# Patient Record
Sex: FEMALE | Race: WHITE | NOT HISPANIC OR LATINO | URBAN - NONMETROPOLITAN AREA
[De-identification: names, ages, dates, MRNs, and addresses within clinical notes are randomized per-mention and may not be internally consistent; named-entity substitution may affect disease eponyms.]

---

## 2017-08-03 ENCOUNTER — OPTICAL OFFICE (OUTPATIENT)
Dept: URBAN - NONMETROPOLITAN AREA CLINIC 4 | Facility: CLINIC | Age: 22
Setting detail: OPHTHALMOLOGY
End: 2017-08-03
Payer: COMMERCIAL

## 2017-08-03 DIAGNOSIS — H52.223: ICD-10-CM

## 2017-08-03 PROCEDURE — V2103 SPHEROCYLINDR 4.00D/12-2.00D: HCPCS | Performed by: OPTOMETRIST

## 2017-08-03 PROCEDURE — V2020 VISION SVCS FRAMES PURCHASES: HCPCS | Performed by: OPTOMETRIST

## 2019-10-15 ENCOUNTER — RX ONLY (RX ONLY)
Age: 24
End: 2019-10-15

## 2019-10-15 ENCOUNTER — DOCTOR'S OFFICE (OUTPATIENT)
Dept: URBAN - NONMETROPOLITAN AREA CLINIC 1 | Facility: CLINIC | Age: 24
Setting detail: OPHTHALMOLOGY
End: 2019-10-15
Payer: COMMERCIAL

## 2019-10-15 ENCOUNTER — OPTICAL OFFICE (OUTPATIENT)
Dept: URBAN - NONMETROPOLITAN AREA CLINIC 4 | Facility: CLINIC | Age: 24
Setting detail: OPHTHALMOLOGY
End: 2019-10-15
Payer: COMMERCIAL

## 2019-10-15 DIAGNOSIS — H52.223: ICD-10-CM

## 2019-10-15 DIAGNOSIS — Z01.00: ICD-10-CM

## 2019-10-15 DIAGNOSIS — H52.13: ICD-10-CM

## 2019-10-15 PROCEDURE — V2103 SPHEROCYLINDR 4.00D/12-2.00D: HCPCS | Performed by: OPTOMETRIST

## 2019-10-15 PROCEDURE — V2020 VISION SVCS FRAMES PURCHASES: HCPCS | Performed by: OPTOMETRIST

## 2019-10-15 PROCEDURE — 92014 COMPRE OPH EXAM EST PT 1/>: CPT | Performed by: OPTOMETRIST

## 2019-10-15 ASSESSMENT — REFRACTION_CURRENTRX
OS_OVR_VA: 20/
OD_OVR_VA: 20/
OD_AXIS: 100
OS_OVR_VA: 20/
OD_SPHERE: -1.75
OS_OVR_VA: 20/
OS_CYLINDER: -1.00
OD_VPRISM_DIRECTION: SV
OS_SPHERE: -1.75
OD_OVR_VA: 20/
OS_VPRISM_DIRECTION: SV
OD_CYLINDER: -0.75
OS_AXIS: 085
OD_OVR_VA: 20/

## 2019-10-15 ASSESSMENT — REFRACTION_MANIFEST
OS_AXIS: 085
OS_CYLINDER: -1.00
OD_VA3: 20/
OD_SPHERE: -2.00
OS_SPHERE: -2.25
OU_VA: 20/
OS_VA2: 20/
OD_VA1: 20/20-2
OS_VA1: 20/20-2
OD_VA1: 20/
OS_VA3: 20/
OS_VA2: 20/25
OD_VA2: 20/25
OS_VA3: 20/
OD_VA3: 20/
OD_VA2: 20/
OD_CYLINDER: -1.00
OS_VA1: 20/
OD_AXIS: 100
OU_VA: 20/

## 2019-10-15 ASSESSMENT — VISUAL ACUITY
OS_BCVA: 20/20-1
OD_BCVA: 20/20-2

## 2019-10-15 ASSESSMENT — AXIALLENGTH_DERIVED
OS_AL: 60.57
OS_AL: 62.22
OD_AL: 59.73
OD_AL: 60.05

## 2019-10-15 ASSESSMENT — REFRACTION_AUTOREFRACTION
OD_CYLINDER: -1.25
OD_AXIS: 101
OS_AXIS: 79
OS_CYLINDER: -1.25
OS_SPHERE: -2.75
OD_SPHERE: -2.00

## 2019-10-15 ASSESSMENT — KERATOMETRY
OD_K1POWER_DIOPTERS: 7.34
OD_K2POWER_DIOPTERS: 7.24
OD_AXISANGLE_DEGREES: 110
OS_AXISANGLE_DEGREES: 77
OS_K1POWER_DIOPTERS: 7.28
OS_K2POWER_DIOPTERS: 7.13

## 2019-10-15 ASSESSMENT — CONFRONTATIONAL VISUAL FIELD TEST (CVF)
OS_FINDINGS: FULL
OD_FINDINGS: FULL

## 2019-10-15 ASSESSMENT — SPHEQUIV_DERIVED
OS_SPHEQUIV: -3.375
OS_SPHEQUIV: -2.75
OD_SPHEQUIV: -2.625
OD_SPHEQUIV: -2.5

## 2021-09-28 DIAGNOSIS — D16.9 BENIGN NEOPLASM OF BONE AND ARTICULAR CARTILAGE, UNSPECIFIED: ICD-10-CM

## 2021-09-28 DIAGNOSIS — R00.2 PALPITATIONS: ICD-10-CM

## 2022-04-06 ENCOUNTER — HOSPITAL ENCOUNTER (OUTPATIENT)
Dept: NON INVASIVE DIAGNOSTICS | Facility: HOSPITAL | Age: 27
Discharge: HOME | End: 2022-04-06
Payer: COMMERCIAL

## 2022-04-06 VITALS
HEIGHT: 64 IN | WEIGHT: 183 LBS | SYSTOLIC BLOOD PRESSURE: 120 MMHG | DIASTOLIC BLOOD PRESSURE: 64 MMHG | BODY MASS INDEX: 31.24 KG/M2

## 2022-04-06 DIAGNOSIS — R00.2 PALPITATIONS: ICD-10-CM

## 2022-04-06 DIAGNOSIS — D16.9 BENIGN NEOPLASM OF BONE AND ARTICULAR CARTILAGE, UNSPECIFIED: ICD-10-CM

## 2022-04-06 LAB
AORTIC VALVE ANNULUS: 2.6 CM (ref 1.69–2.47)
APICAL FOUR CHAMBER EJECTION FRACTION: 74 %
E WAVE DECELERATION TIME: 205 MS
FRACTIONAL SHORTENING: 39 % (ref 28–44)
INTERVENTRICULAR SEPTUM IN DIASTOLE (PARASTERNAL SHORT AXIS VIEW): 0.9 CM
INTERVENTRICULAR SEPTUM: 0.9 CM (ref 0.53–0.99)
LAAS-AP4: 13.1 CM2
LEFT ATRIUM SIZE: 2.9 CM
LEFT INTERNAL DIMENSION IN SYSTOLE: 2.8 CM (ref 2.83–4.28)
LEFT VENTRICULAR INTERNAL DIMENSION IN DIASTOLE: 4.6 CM (ref 4.64–6.91)
LEFT VENTRICULAR POSTERIOR WALL IN END DIASTOLE: 0.8 CM (ref 0.52–0.98)
LEFT VENTRICULAR STROKE VOLUME: 69 ML
LVSV (TEICH): 69 ML
MV E'TISSUE VEL-LAT: 25 CM/S
MV E'TISSUE VEL-SEP: 13 CM/S
MV PEAK A VEL: 0.72 M/S
MV PEAK E VEL: 93 CM/S
SL CV PED ECHO LEFT VENTRICLE DIASTOLIC VOLUME (MOD BIPLANE) 2D: 100 ML
SL CV PED ECHO LEFT VENTRICLE SYSTOLIC VOLUME (MOD BIPLANE) 2D: 30 ML
Z-SCORE OF AORTIC VALVE ANNULUS: 2.66
Z-SCORE OF INTERVENTRICULAR SEPTUM IN END DIASTOLE: 1.18
Z-SCORE OF LEFT VENTRICULAR DIMENSION IN END DIASTOLE: -2.08
Z-SCORE OF LEFT VENTRICULAR DIMENSION IN END SYSTOLE: -1.72
Z-SCORE OF LEFT VENTRICULAR POSTERIOR WALL IN END DIASTOLE: 0.44

## 2022-04-06 PROCEDURE — 93306 TTE W/DOPPLER COMPLETE: CPT

## 2022-11-29 ENCOUNTER — DOCTOR'S OFFICE (OUTPATIENT)
Dept: URBAN - NONMETROPOLITAN AREA CLINIC 1 | Facility: CLINIC | Age: 27
Setting detail: OPHTHALMOLOGY
End: 2022-11-29
Payer: COMMERCIAL

## 2022-11-29 ENCOUNTER — OPTICAL OFFICE (OUTPATIENT)
Dept: URBAN - NONMETROPOLITAN AREA CLINIC 4 | Facility: CLINIC | Age: 27
Setting detail: OPHTHALMOLOGY
End: 2022-11-29
Payer: COMMERCIAL

## 2022-11-29 DIAGNOSIS — H52.13: ICD-10-CM

## 2022-11-29 DIAGNOSIS — Z01.00: ICD-10-CM

## 2022-11-29 PROCEDURE — V2020 VISION SVCS FRAMES PURCHASES: HCPCS | Performed by: OPTOMETRIST

## 2022-11-29 PROCEDURE — V2784 LENS POLYCARB OR EQUAL: HCPCS | Performed by: OPTOMETRIST

## 2022-11-29 PROCEDURE — 92004 COMPRE OPH EXAM NEW PT 1/>: CPT | Performed by: OPTOMETRIST

## 2022-11-29 PROCEDURE — V2103 SPHEROCYLINDR 4.00D/12-2.00D: HCPCS | Performed by: OPTOMETRIST

## 2022-11-29 ASSESSMENT — REFRACTION_AUTOREFRACTION
OD_SPHERE: -2.50
OD_AXIS: 100
OS_AXIS: 070
OS_SPHERE: -2.75
OD_CYLINDER: -0.50
OS_CYLINDER: -0.75

## 2022-11-29 ASSESSMENT — AXIALLENGTH_DERIVED
OS_AL: 61.88
OD_AL: 60.36
OS_AL: 61.55
OD_AL: 61.34

## 2022-11-29 ASSESSMENT — REFRACTION_CURRENTRX
OD_AXIS: 104
OS_VPRISM_DIRECTION: SV
OS_SPHERE: -2.25
OD_OVR_VA: 20/
OD_SPHERE: -2.00
OD_VPRISM_DIRECTION: SV
OD_CYLINDER: -1.00
OS_AXIS: 090
OS_CYLINDER: -1.00
OS_OVR_VA: 20/

## 2022-11-29 ASSESSMENT — CONFRONTATIONAL VISUAL FIELD TEST (CVF)
OD_FINDINGS: FULL
OS_FINDINGS: FULL

## 2022-11-29 ASSESSMENT — KERATOMETRY
OS_K1POWER_DIOPTERS: 7.28
OS_AXISANGLE_DEGREES: 77
OD_K2POWER_DIOPTERS: 7.24
OD_AXISANGLE_DEGREES: 110
OS_K2POWER_DIOPTERS: 7.13
OD_K1POWER_DIOPTERS: 7.34

## 2022-11-29 ASSESSMENT — VISUAL ACUITY
OD_BCVA: 20/40+1
OS_BCVA: 20/40-1

## 2022-11-29 ASSESSMENT — SPHEQUIV_DERIVED
OS_SPHEQUIV: -3.25
OD_SPHEQUIV: -3.125
OD_SPHEQUIV: -2.75
OS_SPHEQUIV: -3.125

## 2022-11-29 ASSESSMENT — REFRACTION_MANIFEST
OD_VA1: 20/25+2
OD_SPHERE: -2.50
OD_VA2: 20/25
OD_AXIS: 100
OS_SPHERE: -2.75
OS_CYLINDER: -1.00
OS_VA1: 20/25+2
OS_AXIS: 085
OS_VA2: 20/25
OD_CYLINDER: -1.25

## 2022-11-29 ASSESSMENT — TONOMETRY
OS_IOP_MMHG: 15
OD_IOP_MMHG: 15

## 2023-11-30 ENCOUNTER — DOCTOR'S OFFICE (OUTPATIENT)
Dept: URBAN - NONMETROPOLITAN AREA CLINIC 1 | Facility: CLINIC | Age: 28
Setting detail: OPHTHALMOLOGY
End: 2023-11-30
Payer: COMMERCIAL

## 2023-11-30 DIAGNOSIS — H52.223: ICD-10-CM

## 2023-11-30 DIAGNOSIS — H52.13: ICD-10-CM

## 2023-11-30 PROBLEM — H04.123 DRY EYE SYNDROME LACRIMAL GLAND; BOTH EYES: Status: ACTIVE | Noted: 2023-11-30

## 2023-11-30 PROCEDURE — 92015 DETERMINE REFRACTIVE STATE: CPT

## 2023-11-30 PROCEDURE — 92014 COMPRE OPH EXAM EST PT 1/>: CPT

## 2023-11-30 ASSESSMENT — REFRACTION_CURRENTRX
OS_OVR_VA: 20/
OD_SPHERE: -2.50
OD_VPRISM_DIRECTION: SV
OS_CYLINDER: -1.00
OS_VPRISM_DIRECTION: SV
OD_AXIS: 096
OS_SPHERE: -2.75
OD_CYLINDER: -1.25
OD_OVR_VA: 20/
OS_AXIS: 085

## 2023-11-30 ASSESSMENT — CONFRONTATIONAL VISUAL FIELD TEST (CVF)
OD_FINDINGS: FULL
OS_FINDINGS: FULL

## 2023-11-30 ASSESSMENT — TONOMETRY
OS_IOP_MMHG: 16
OD_IOP_MMHG: 16

## 2023-11-30 ASSESSMENT — REFRACTION_MANIFEST
OD_VA2: 20/25
OD_VA1: 20/25
OS_SPHERE: -3.00
OD_AXIS: 095
OD_SPHERE: -2.75
OS_VA2: 20/25
OS_VA1: 20/25
OU_VA: 20/25
OD_CYLINDER: -1.25
OS_AXIS: 075
OS_CYLINDER: -1.50

## 2023-11-30 ASSESSMENT — SPHEQUIV_DERIVED
OD_SPHEQUIV: -2.625
OS_SPHEQUIV: -3.75
OS_SPHEQUIV: -3.25
OD_SPHEQUIV: -3.375

## 2023-11-30 ASSESSMENT — REFRACTION_AUTOREFRACTION
OS_AXIS: 086
OD_CYLINDER: -1.25
OD_SPHERE: -2.00
OS_CYLINDER: -1.00
OS_SPHERE: -2.75
OD_AXIS: 106

## 2023-11-30 ASSESSMENT — VISUAL ACUITY
OS_BCVA: 20/30-1
OD_BCVA: 20/40

## 2023-11-30 ASSESSMENT — SUPERFICIAL PUNCTATE KERATITIS (SPK)
OS_SPK: T
OD_SPK: T

## 2023-11-30 ASSESSMENT — TEAR BREAK UP TIME (TBUT)
OS_TBUT: 5 SEC
OD_TBUT: 5 SEC

## 2024-01-09 ENCOUNTER — APPOINTMENT (OUTPATIENT)
Dept: RADIOLOGY | Facility: CLINIC | Age: 29
End: 2024-01-09
Payer: COMMERCIAL

## 2024-01-09 ENCOUNTER — OFFICE VISIT (OUTPATIENT)
Dept: URGENT CARE | Facility: CLINIC | Age: 29
End: 2024-01-09
Payer: COMMERCIAL

## 2024-01-09 VITALS
TEMPERATURE: 97.6 F | WEIGHT: 135 LBS | HEIGHT: 64 IN | SYSTOLIC BLOOD PRESSURE: 124 MMHG | OXYGEN SATURATION: 99 % | BODY MASS INDEX: 23.05 KG/M2 | RESPIRATION RATE: 16 BRPM | HEART RATE: 76 BPM | DIASTOLIC BLOOD PRESSURE: 70 MMHG

## 2024-01-09 DIAGNOSIS — S16.1XXA STRAIN OF MUSCLE, FASCIA AND TENDON AT NECK LEVEL, INITIAL ENCOUNTER: Primary | ICD-10-CM

## 2024-01-09 DIAGNOSIS — S16.1XXA STRAIN OF MUSCLE, FASCIA AND TENDON AT NECK LEVEL, INITIAL ENCOUNTER: ICD-10-CM

## 2024-01-09 PROCEDURE — 72040 X-RAY EXAM NECK SPINE 2-3 VW: CPT

## 2024-01-09 PROCEDURE — 99203 OFFICE O/P NEW LOW 30 MIN: CPT

## 2024-01-09 RX ORDER — FEXOFENADINE HCL 180 MG/1
180 TABLET ORAL DAILY
COMMUNITY

## 2024-01-09 RX ORDER — ALBUTEROL SULFATE 90 UG/1
2 AEROSOL, METERED RESPIRATORY (INHALATION) EVERY 6 HOURS PRN
COMMUNITY

## 2024-01-09 NOTE — PATIENT INSTRUCTIONS
Pain control recommendations include Tylenol 1000 mg combined with ibuprofen 600 or 800 mg 3 times a day which are shown to outperform opiate pain medication.    Max dose of tylenol per day is 4000 mg  Max dose of ibuprofen per day is 3500 mg

## 2024-01-09 NOTE — PROGRESS NOTES
[facility]        NAME: Nalini Salinas is a 28 y.o. female  : 1995    MRN: [id number]  DATE: 2024  TIME: 12:05 PM    Assessment and Plan   Strain of muscle, fascia and tendon at neck level, initial encounter [S16.1XXA]  1. Strain of muscle, fascia and tendon at neck level, initial encounter  XR spine cervical 2 or 3 vw injury        Discussed problem with patient.  X-rays revealed straining of natural neck curvature.  No acute osseous abnormalities.  Suspicious of strain and advised Tylenol and ibuprofen for pain symptoms and max dosages were discussed.  Also should be using ice and heat and instructed on how to use.  Discussed red flag symptoms and she report to the ER if experiencing.  Discussed risk of not reporting this as Worker's Comp., patient is still adamantly denying.    Patient Instructions       Follow up with PCP in 3-5 days.  Proceed to  ER if symptoms worsen.    Chief Complaint     Chief Complaint   Patient presents with   • Neck Pain     Works at [employer] and was lifting a 20lb block of cheese 8 days ago and has pain in left side of neck since. Refuses to have this on workmen comp.         History of Present Illness       Works at [employer] and was lifting a 20lb block of cheese 8 days ago and landed onto left side of neck. Refuses to have this on workmen comp. Using icy/hot, advil every 6 hours. 6/10 with movement.  States pain radiates from left-sided neck to left-sided shoulder.  Denies any shoulder complaints.  Denies any numbness or tingling.  Denies any head strike or loss of consciousness    Neck Pain   Pertinent negatives include no chest pain, fever or headaches.       Review of Systems   Review of Systems   Constitutional:  Negative for appetite change, chills, fatigue and fever.   Respiratory:  Negative for cough, shortness of breath, wheezing and stridor.    Cardiovascular:  Negative for chest pain, palpitations and leg swelling.   Musculoskeletal:   "Positive for neck pain.   Neurological:  Negative for dizziness, syncope, light-headedness and headaches.         Current Medications       Current Outpatient Medications:   •  albuterol (PROVENTIL HFA,VENTOLIN HFA) 90 mcg/act inhaler, Inhale 2 puffs every 6 (six) hours as needed, Disp: , Rfl:   •  fexofenadine (ALLEGRA) 180 MG tablet, Take 180 mg by mouth daily, Disp: , Rfl:     Current Allergies     Allergies as of 2024   • (No Known Allergies)            The following portions of the patient's history were reviewed and updated as appropriate: allergies, current medications, past family history, past medical history, past social history, past surgical history and problem list.     Past Medical History:   Diagnosis Date   • Anxiety    • Depression    • SVT (supraventricular tachycardia)        Past Surgical History:   Procedure Laterality Date   • AV NODE ABLATION     •  SECTION     • TONSILLECTOMY         Family History   Problem Relation Age of Onset   • Heart disease Mother    • Anxiety disorder Father    • Diabetes Father    • Cancer Father          Medications have been verified.        Objective   /70   Pulse 76   Temp 97.6 °F (36.4 °C)   Resp 16   Ht 5' 4\" (1.626 m)   Wt 61.2 kg (135 lb)   LMP 2023   SpO2 99%   BMI 23.17 kg/m²        Physical Exam     Physical Exam  Vitals and nursing note reviewed.   Constitutional:       General: She is not in acute distress.     Appearance: Normal appearance. She is normal weight. She is not ill-appearing, toxic-appearing or diaphoretic.   Neck:      Vascular: Normal carotid pulses. No carotid bruit, hepatojugular reflux or JVD.      Trachea: Trachea and phonation normal. No tracheal tenderness, tracheostomy, abnormal tracheal secretions or tracheal deviation.      Comments: Generalized tenderness the patient's left-sided neck specifically along trapezius muscle.  No overlying skin changes or deformities.  Decreased range of motion using " left-sided head turning as well as left-sided lateral movements before right-sided movements.  No difficulty with flexion and extension.  Cardiovascular:      Rate and Rhythm: Normal rate and regular rhythm.      Pulses: Normal pulses.      Heart sounds: Normal heart sounds. No murmur heard.     No friction rub. No gallop.   Pulmonary:      Effort: Pulmonary effort is normal. No respiratory distress.      Breath sounds: Normal breath sounds. No stridor. No wheezing, rhonchi or rales.   Chest:      Chest wall: No tenderness.   Musculoskeletal:      Cervical back: Neck supple. No edema, erythema, signs of trauma, rigidity, torticollis or crepitus. Pain with movement and muscular tenderness present. No spinous process tenderness. Decreased range of motion.   Neurological:      Mental Status: She is alert.

## 2024-02-23 ENCOUNTER — APPOINTMENT (OUTPATIENT)
Dept: RADIOLOGY | Facility: CLINIC | Age: 29
End: 2024-02-23
Payer: COMMERCIAL

## 2024-02-23 ENCOUNTER — OFFICE VISIT (OUTPATIENT)
Dept: URGENT CARE | Facility: CLINIC | Age: 29
End: 2024-02-23
Payer: COMMERCIAL

## 2024-02-23 ENCOUNTER — TELEPHONE (OUTPATIENT)
Age: 29
End: 2024-02-23

## 2024-02-23 VITALS
TEMPERATURE: 96.4 F | RESPIRATION RATE: 16 BRPM | HEART RATE: 91 BPM | WEIGHT: 135 LBS | SYSTOLIC BLOOD PRESSURE: 126 MMHG | OXYGEN SATURATION: 99 % | HEIGHT: 64 IN | BODY MASS INDEX: 23.05 KG/M2 | DIASTOLIC BLOOD PRESSURE: 68 MMHG

## 2024-02-23 DIAGNOSIS — L30.9 ECZEMA, UNSPECIFIED TYPE: ICD-10-CM

## 2024-02-23 DIAGNOSIS — S69.92XA INJURY OF LEFT HAND, INITIAL ENCOUNTER: Primary | ICD-10-CM

## 2024-02-23 DIAGNOSIS — S69.92XA INJURY OF LEFT HAND, INITIAL ENCOUNTER: ICD-10-CM

## 2024-02-23 PROCEDURE — 99213 OFFICE O/P EST LOW 20 MIN: CPT

## 2024-02-23 PROCEDURE — 29125 APPL SHORT ARM SPLINT STATIC: CPT

## 2024-02-23 PROCEDURE — 73130 X-RAY EXAM OF HAND: CPT

## 2024-02-23 RX ORDER — TRIAMCINOLONE ACETONIDE 1 MG/G
CREAM TOPICAL 2 TIMES DAILY
Qty: 45 G | Refills: 0 | Status: SHIPPED | OUTPATIENT
Start: 2024-02-23

## 2024-02-23 NOTE — PATIENT INSTRUCTIONS
Apply cream as needed  Can take antihistamine, such as Zyrtec, as needed for itching  Apply cool compresses as needed  Ice hand as needed  Tylenol or ibuprofen as needed for pain and inflammation   Wear brace as needed  Follow up with PCP in 3-5 days.  Proceed to  ER if symptoms worsen.

## 2024-02-23 NOTE — TELEPHONE ENCOUNTER
Patient is being referred to a orthopedics. Please schedule accordingly.    [facility]   [phone]

## 2024-02-23 NOTE — PROGRESS NOTES
[facility]        NAME: Nalini Salinas is a 29 y.o. female  : 1995    MRN: [id number]  DATE: 2024  TIME: 1:51 PM    Assessment and Plan   Injury of left hand, initial encounter [S69.92XA]  1. Injury of left hand, initial encounter  XR hand 3+ vw left    Ambulatory Referral to Orthopedic Surgery      2. Eczema, unspecified type  triamcinolone (KENALOG) 0.1 % cream        Preliminary xray read by myself. Suspicious for possible fracture at 5th proximal phalanx. Pending radiologist final read.      Ulnar gutter brace applied and ortho referral given.     Patient Instructions     Apply cream as needed  Can take antihistamine, such as Zyrtec, as needed for itching  Apply cool compresses as needed  Ice hand as needed  Tylenol or ibuprofen as needed for pain and inflammation   Wear brace as needed  Follow up with PCP in 3-5 days.  Proceed to  ER if symptoms worsen.    Chief Complaint     Chief Complaint   Patient presents with    Rash     Rash on both hands. Using OTC cortisone cream. No better.     Hand Pain     Hurt left hand on car door X 1 day ago Bruising on knuckles         History of Present Illness       Patient is presenting with rash on bilateral hands.  She states she does have a history of eczema.  She has been using over-the-counter cortisone cream without much relief.  She states she has used triamcinolone in the past with relief.  She also hurt her left hand on a car door 1 day ago.  She does have bruising on her knuckles and pain with movement of her fingers.     Rash    Hand Pain         Review of Systems   Review of Systems   Constitutional: Negative.    Respiratory: Negative.     Cardiovascular: Negative.    Musculoskeletal:  Positive for joint swelling (4th and 5th knuckles on L hand).   Skin:  Positive for color change (bruising) and rash.         Current Medications       Current Outpatient Medications:     triamcinolone (KENALOG) 0.1 % cream, Apply topically 2  "(two) times a day, Disp: 45 g, Rfl: 0    albuterol (PROVENTIL HFA,VENTOLIN HFA) 90 mcg/act inhaler, Inhale 2 puffs every 6 (six) hours as needed (Patient not taking: Reported on 2024), Disp: , Rfl:     fexofenadine (ALLEGRA) 180 MG tablet, Take 180 mg by mouth daily (Patient not taking: Reported on 2024), Disp: , Rfl:     Current Allergies     Allergies as of 2024    (No Known Allergies)            The following portions of the patient's history were reviewed and updated as appropriate: allergies, current medications, past family history, past medical history, past social history, past surgical history and problem list.     Past Medical History:   Diagnosis Date    Anxiety     Depression     SVT (supraventricular tachycardia)        Past Surgical History:   Procedure Laterality Date    AV NODE ABLATION       SECTION      TONSILLECTOMY         Family History   Problem Relation Age of Onset    Heart disease Mother     Anxiety disorder Father     Diabetes Father     Cancer Father          Medications have been verified.        Objective   /68   Pulse 91   Temp (!) 96.4 °F (35.8 °C)   Resp 16   Ht 5' 4\" (1.626 m)   Wt 61.2 kg (135 lb)   LMP 2024   SpO2 99%   BMI 23.17 kg/m²        Physical Exam     Physical Exam  Constitutional:       Appearance: Normal appearance.   Cardiovascular:      Rate and Rhythm: Normal rate.      Pulses: Normal pulses.   Pulmonary:      Effort: Pulmonary effort is normal.   Musculoskeletal:         General: Swelling and tenderness present.   Skin:     General: Skin is warm and dry.      Capillary Refill: Capillary refill takes less than 2 seconds.      Findings: Bruising (4th and 5th knuckles of L hand) and rash (eczema on bilateral hands) present.   Neurological:      General: No focal deficit present.      Mental Status: She is alert and oriented to person, place, and time. Mental status is at baseline.                   " 2/23/2024 1:30 PM    Performed by: Marco Carroll PA-C  Authorized by: Marco Carroll PA-C    Patient Location:  Bedside  [place] Protocol:  Consent: Verbal consent obtained.  Risks and benefits: risks, benefits and alternatives were discussed  Consent given by: patient    Injury location:  Hand  Location details:  Left hand  Injury type:  Soft tissue  Neurovascular status: Neurovascularly intact    Distal perfusion: normal    Neurological function: normal    Range of motion: normal    Immobilization:  Brace  Splint type:  Ulnar gutter  Neurovascular status: Neurovascularly intact    Distal perfusion: normal    Neurological function: normal    Range of motion: normal    Patient tolerance:  Patient tolerated the procedure well with no immediate complications                   no

## 2024-03-28 ENCOUNTER — OFFICE VISIT (OUTPATIENT)
Dept: OBGYN CLINIC | Facility: CLINIC | Age: 29
End: 2024-03-28

## 2024-03-28 ENCOUNTER — APPOINTMENT (OUTPATIENT)
Dept: RADIOLOGY | Facility: MEDICAL CENTER | Age: 29
End: 2024-03-28
Payer: COMMERCIAL

## 2024-03-28 VITALS
BODY MASS INDEX: 23.05 KG/M2 | WEIGHT: 135 LBS | SYSTOLIC BLOOD PRESSURE: 109 MMHG | DIASTOLIC BLOOD PRESSURE: 53 MMHG | HEIGHT: 64 IN | HEART RATE: 73 BPM

## 2024-03-28 DIAGNOSIS — S60.222A CONTUSION OF LEFT HAND, INITIAL ENCOUNTER: Primary | ICD-10-CM

## 2024-03-28 DIAGNOSIS — S69.92XA INJURY OF LEFT HAND, INITIAL ENCOUNTER: ICD-10-CM

## 2024-03-28 PROCEDURE — 99203 OFFICE O/P NEW LOW 30 MIN: CPT | Performed by: ORTHOPAEDIC SURGERY

## 2024-03-28 PROCEDURE — 73140 X-RAY EXAM OF FINGER(S): CPT

## 2024-03-28 NOTE — PROGRESS NOTES
ASSESSMENT/PLAN:    Diagnoses and all orders for this visit:    Contusion of left hand, initial encounter  -     Ambulatory Referral to Physical Therapy; Future    Injury of left hand, initial encounter  -     Ambulatory Referral to Orthopedic Surgery  -     XR finger left fifth digit-percy; Future  -     Ambulatory Referral to Physical Therapy; Future        X-rays of the patient's left hand are negative for any fractures or dislocations.  The patient has a resolving left hand contusion.  Possible treatment options were discussed with the patient.  She was given a prescription for physical therapy to work on strengthening, stretching and range of motion.  She will follow-up with our office in approximately 3 months.  The patient is acceptable to this plan.    Return in about 3 months (around 6/28/2024).    The patient has a contusion of her left hand.    No fractures were present.  This was confirmed with repeat x-rays.  Follow-up 3 months evaluation.  She was given a prescription for therapy.  She will check around her domicile who participates in her insurance as well as the region of injury      _____________________________________________________  CHIEF COMPLAINT:  Chief Complaint   Patient presents with    Left Hand - Pain         SUBJECTIVE:  Nalini Salinas is a 29 y.o. female who presents to our office complaining of left hand pain.  The patient states that she closed her hand in a car door on 2/21/2024.  She was seen in urgent care, where x-rays were performed.  X-rays were consistent with a possible occult fracture of her left fifth finger.  The patient states she was given a splint.  She still complains of left hand pain, albeit improved since the injury.  She denies any numbness or tingling.  She denies any fever or chills.    The following portions of the patient's history were reviewed and updated as appropriate: allergies, current medications, past family history, past medical history, past  social history, past surgical history and problem list.    PAST MEDICAL HISTORY:  Past Medical History:   Diagnosis Date    Anxiety     Depression     SVT (supraventricular tachycardia)        PAST SURGICAL HISTORY:  Past Surgical History:   Procedure Laterality Date    AV NODE ABLATION       SECTION      TONSILLECTOMY         FAMILY HISTORY:  Family History   Problem Relation Age of Onset    Heart disease Mother     Anxiety disorder Father     Diabetes Father     Cancer Father        SOCIAL HISTORY:  Social History     Tobacco Use    Smoking status: Every Day     Current packs/day: 1.00     Types: Cigarettes     Passive exposure: Current    Smokeless tobacco: Never   Substance Use Topics    Alcohol use: Yes     Comment: occasionally    Drug use: Never       MEDICATIONS:    Current Outpatient Medications:     triamcinolone (KENALOG) 0.1 % cream, Apply topically 2 (two) times a day, Disp: 45 g, Rfl: 0    albuterol (PROVENTIL HFA,VENTOLIN HFA) 90 mcg/act inhaler, Inhale 2 puffs every 6 (six) hours as needed (Patient not taking: Reported on 2024), Disp: , Rfl:     fexofenadine (ALLEGRA) 180 MG tablet, Take 180 mg by mouth daily (Patient not taking: Reported on 2024), Disp: , Rfl:     ALLERGIES:  No Known Allergies    ROS:  Review of Systems     Constitutional: Negative for fatigue, fever or loss of appetite.   HENT: Negative.    Respiratory: Negative for shortness of breath, dyspnea.    Cardiovascular: Negative for chest pain/tightness.   Gastrointestinal: Negative for abdominal pain, N/V.   Endocrine: Negative for cold/heat intolerance, unexplained weight loss/gain.   Genitourinary: Negative for flank pain, dysuria, hematuria.   Musculoskeletal: Positive for arthralgia   Skin: Negative for rash.    Neurological: Negative for numbness or tingling  Psychiatric/Behavioral: Negative for agitation.  _____________________________________________________  PHYSICAL EXAMINATION:    Blood pressure 109/53,  "pulse 73, height 5' 4\" (1.626 m), weight 61.2 kg (135 lb).    Constitutional: Oriented to person, place, and time. Appears well-developed and well-nourished. No distress.   HENT:   Head: Normocephalic.   Eyes: Conjunctivae are normal. Right eye exhibits no discharge. Left eye exhibits no discharge. No scleral icterus.   Cardiovascular: Normal rate.    Pulmonary/Chest: Effort normal.   Neurological: Alert and oriented to person, place, and time.   Skin: Skin is warm and dry. No rash noted. Not diaphoretic. No erythema. No pallor.   Psychiatric: Normal mood and affect. Behavior is normal. Judgment and thought content normal.      MUSCULOSKELETAL EXAMINATION:   Physical Exam  Ortho Exam    Left upper extremity is neurovascular intact  Fingers are pink and mobile  Compartments are soft  Generalized tenderness palpation along left fifth finger  Brisk cap refill and sensation intact  No tenderness to palpation along wrist  No warmth or erythema  No ecchymosis  No edema  Objective:  BP Readings from Last 1 Encounters:   03/28/24 109/53      Wt Readings from Last 1 Encounters:   03/28/24 61.2 kg (135 lb)        BMI:   Estimated body mass index is 23.17 kg/m² as calculated from the following:    Height as of this encounter: 5' 4\" (1.626 m).    Weight as of this encounter: 61.2 kg (135 lb).        Scribe Attestation      I,:  Mcihael Ferrell PA-C am acting as a scribe while in the presence of the attending physician.:       I,:  Johnathon Lu, DO personally performed the services described in this documentation    as scribed in my presence.:            "

## 2025-01-29 ENCOUNTER — OFFICE VISIT (OUTPATIENT)
Dept: URGENT CARE | Facility: CLINIC | Age: 30
End: 2025-01-29
Payer: COMMERCIAL

## 2025-01-29 VITALS
HEART RATE: 68 BPM | OXYGEN SATURATION: 99 % | BODY MASS INDEX: 23.92 KG/M2 | TEMPERATURE: 97 F | RESPIRATION RATE: 18 BRPM | DIASTOLIC BLOOD PRESSURE: 68 MMHG | SYSTOLIC BLOOD PRESSURE: 108 MMHG | WEIGHT: 135 LBS | HEIGHT: 63 IN

## 2025-01-29 DIAGNOSIS — J01.40 ACUTE NON-RECURRENT PANSINUSITIS: Primary | ICD-10-CM

## 2025-01-29 PROCEDURE — 99213 OFFICE O/P EST LOW 20 MIN: CPT | Performed by: FAMILY MEDICINE

## 2025-01-29 RX ORDER — PREDNISONE 50 MG/1
50 TABLET ORAL DAILY
Qty: 5 TABLET | Refills: 0 | Status: SHIPPED | OUTPATIENT
Start: 2025-01-29 | End: 2025-02-03

## 2025-01-29 RX ORDER — BENZONATATE 200 MG/1
200 CAPSULE ORAL 3 TIMES DAILY PRN
Qty: 20 CAPSULE | Refills: 0 | Status: SHIPPED | OUTPATIENT
Start: 2025-01-29

## 2025-01-29 RX ORDER — SULFAMETHOXAZOLE AND TRIMETHOPRIM 800; 160 MG/1; MG/1
1 TABLET ORAL EVERY 12 HOURS SCHEDULED
Qty: 20 TABLET | Refills: 0 | Status: SHIPPED | OUTPATIENT
Start: 2025-01-29 | End: 2025-02-08

## 2025-01-29 RX ORDER — ALBUTEROL SULFATE 90 UG/1
2 INHALANT RESPIRATORY (INHALATION) EVERY 6 HOURS PRN
Qty: 18 G | Refills: 0 | Status: SHIPPED | OUTPATIENT
Start: 2025-01-29

## 2025-01-30 NOTE — PROGRESS NOTES
[facility]        NAME: Nalini Salinas is a 29 y.o. female  : 1995    MRN: [id number]  DATE: 2025  TIME: 7:40 PM    Assessment and Plan   Acute non-recurrent pansinusitis [J01.40]  1. Acute non-recurrent pansinusitis  sulfamethoxazole-trimethoprim (BACTRIM DS) 800-160 mg per tablet    benzonatate (TESSALON) 200 MG capsule    predniSONE 50 mg tablet    albuterol (PROVENTIL HFA,VENTOLIN HFA) 90 mcg/act inhaler            Patient Instructions     No ibuprofen while taking prednisone.      Mucinex/guaifenesin for congestion and mucousy cough.  Delsym for dry cough and cough suppression.  Cepacol throat lozenges for sore throat.    Follow up with PCP in 3-5 days.  Proceed to  ER if symptoms worsen.    If tests have been performed at [facility], our office will contact you with results if changes need to be made to the care plan discussed with you at the visit.  [facility]    Chief Complaint     Chief Complaint   Patient presents with   • Cough     Cough, sinus and chest congestion started 2 weeks ago          History of Present Illness       Cough (Cough, sinus and chest congestion started 2 weeks ago )  Patient did have some some promethazine at home that she has been taking for cough with minimal relief.  She does have a albuterol rescue inhaler which she has been using every 4-6 hours.  She has had fevers up to 101.    Cough  This is a new problem. The current episode started 1 to 4 weeks ago. The problem has been gradually worsening. The cough is Productive of sputum. Associated symptoms include chills, ear congestion, ear pain, a fever, nasal congestion, rhinorrhea and wheezing.       Review of Systems   Review of Systems   Constitutional:  Positive for chills and fever.   HENT:  Positive for ear pain and rhinorrhea.    Respiratory:  Positive for cough and wheezing.          Current Medications       Current Outpatient Medications:   •   "albuterol (PROVENTIL HFA,VENTOLIN HFA) 90 mcg/act inhaler, Inhale 2 puffs every 6 (six) hours as needed for wheezing, Disp: 18 g, Rfl: 0  •  benzonatate (TESSALON) 200 MG capsule, Take 1 capsule (200 mg total) by mouth 3 (three) times a day as needed for cough, Disp: 20 capsule, Rfl: 0  •  predniSONE 50 mg tablet, Take 1 tablet (50 mg total) by mouth daily for 5 days, Disp: 5 tablet, Rfl: 0  •  sulfamethoxazole-trimethoprim (BACTRIM DS) 800-160 mg per tablet, Take 1 tablet by mouth every 12 (twelve) hours for 10 days, Disp: 20 tablet, Rfl: 0  •  fexofenadine (ALLEGRA) 180 MG tablet, Take 180 mg by mouth daily, Disp: , Rfl:   •  triamcinolone (KENALOG) 0.1 % cream, Apply topically 2 (two) times a day (Patient not taking: Reported on 2025), Disp: 45 g, Rfl: 0    Current Allergies     Allergies as of 2025   • (No Known Allergies)            The following portions of the patient's history were reviewed and updated as appropriate: allergies, current medications, past family history, past medical history, past social history, past surgical history and problem list.     Past Medical History:   Diagnosis Date   • Allergic    • Anxiety    • Depression    • SVT (supraventricular tachycardia) (HCC)        Past Surgical History:   Procedure Laterality Date   • AV NODE ABLATION     •  SECTION     • TONSILLECTOMY         Family History   Problem Relation Age of Onset   • Heart disease Mother    • Anxiety disorder Father    • Diabetes Father    • Cancer Father          Medications have been verified.        Objective   /68   Pulse 68   Temp (!) 97 °F (36.1 °C)   Resp 18   Ht 5' 3\" (1.6 m)   Wt 61.2 kg (135 lb)   LMP 2025 (Approximate)   SpO2 99%   BMI 23.91 kg/m²   Patient's last menstrual period was 2025 (approximate).       Physical Exam     Physical Exam  Vitals and nursing note reviewed.   Constitutional:       General: She is not in acute distress.     Appearance: She is " well-developed. She is ill-appearing.   HENT:      Right Ear: Tympanic membrane and external ear normal.      Left Ear: Tympanic membrane and external ear normal.      Nose:      Right Sinus: Maxillary sinus tenderness and frontal sinus tenderness present.      Left Sinus: Maxillary sinus tenderness and frontal sinus tenderness present.      Mouth/Throat:      Mouth: Mucous membranes are moist.      Pharynx: No oropharyngeal exudate.   Eyes:      Conjunctiva/sclera: Conjunctivae normal.   Cardiovascular:      Rate and Rhythm: Normal rate and regular rhythm.      Heart sounds: Normal heart sounds. No murmur heard.  Pulmonary:      Effort: Pulmonary effort is normal. No respiratory distress.      Breath sounds: Examination of the right-middle field reveals wheezing. Examination of the left-middle field reveals wheezing. Examination of the right-lower field reveals wheezing. Examination of the left-lower field reveals wheezing. Wheezing present. No rales.   Chest:      Chest wall: No tenderness.   Musculoskeletal:         General: Normal range of motion.      Cervical back: Normal range of motion and neck supple.   Lymphadenopathy:      Cervical: No cervical adenopathy.   Skin:     General: Skin is warm.      Findings: No erythema or rash.   Neurological:      Mental Status: She is alert and oriented to person, place, and time.

## 2025-01-30 NOTE — PATIENT INSTRUCTIONS
No ibuprofen while taking prednisone.      Mucinex/guaifenesin for congestion and mucousy cough.  Delsym for dry cough and cough suppression.  Cepacol throat lozenges for sore throat.    Follow up with PCP in 3-5 days.  Proceed to  ER if symptoms worsen.

## 2025-04-03 ENCOUNTER — DOCTOR'S OFFICE (OUTPATIENT)
Dept: URBAN - NONMETROPOLITAN AREA CLINIC 1 | Facility: CLINIC | Age: 30
Setting detail: OPHTHALMOLOGY
End: 2025-04-03
Payer: COMMERCIAL

## 2025-04-03 ENCOUNTER — RX ONLY (RX ONLY)
Age: 30
End: 2025-04-03

## 2025-04-03 DIAGNOSIS — H52.223: ICD-10-CM

## 2025-04-03 DIAGNOSIS — H52.13: ICD-10-CM

## 2025-04-03 PROBLEM — H52.7 DISORDER OF REFRACTION OR ACCOMODATION, UNSPECIFIED: Status: ACTIVE | Noted: 2025-04-03

## 2025-04-03 PROCEDURE — 92015 DETERMINE REFRACTIVE STATE: CPT

## 2025-04-03 PROCEDURE — 92014 COMPRE OPH EXAM EST PT 1/>: CPT

## 2025-04-03 ASSESSMENT — SUPERFICIAL PUNCTATE KERATITIS (SPK)
OS_SPK: T
OD_SPK: T

## 2025-04-03 ASSESSMENT — TONOMETRY
OD_IOP_MMHG: 18
OS_IOP_MMHG: 18

## 2025-04-03 ASSESSMENT — KERATOMETRY
OD_K1POWER_DIOPTERS: 7.34
OD_AXISANGLE_DEGREES: 110
OS_K1POWER_DIOPTERS: 7.28
OS_K2POWER_DIOPTERS: 7.13
OD_K2POWER_DIOPTERS: 7.24
OS_AXISANGLE_DEGREES: 77

## 2025-04-03 ASSESSMENT — REFRACTION_CURRENTRX
OS_SPHERE: -3.00
OS_CYLINDER: -1.50
OS_OVR_VA: 20/
OS_AXIS: 77
OD_VPRISM_DIRECTION: SV
OD_OVR_VA: 20/
OD_CYLINDER: -1.25
OD_AXIS: 098
OD_SPHERE: -2.75
OS_VPRISM_DIRECTION: SV

## 2025-04-03 ASSESSMENT — REFRACTION_MANIFEST
OS_CYLINDER: -1.00
OU_VA: 20/25
OS_ADD: +1.50
OD_SPHERE: -2.50
OS_VA1: 20/25
OD_VA2: 20/25
OS_AXIS: 080
OD_AXIS: 100
OS_VA2: 20/25
OD_ADD: +1.50
OD_CYLINDER: -1.75
OD_VA1: 20/25
OS_SPHERE: -3.50

## 2025-04-03 ASSESSMENT — REFRACTION_AUTOREFRACTION
OS_CYLINDER: -1.25
OD_CYLINDER: -1.25
OD_SPHERE: -2.50
OS_SPHERE: -3.00
OD_AXIS: 101
OS_AXIS: 68

## 2025-04-03 ASSESSMENT — TEAR BREAK UP TIME (TBUT)
OS_TBUT: 5 SEC
OD_TBUT: 5 SEC

## 2025-04-03 ASSESSMENT — VISUAL ACUITY
OD_BCVA: 20/30
OS_BCVA: 20/40+1

## 2025-04-03 ASSESSMENT — CONFRONTATIONAL VISUAL FIELD TEST (CVF)
OD_FINDINGS: FULL
OS_FINDINGS: FULL

## 2025-04-07 ENCOUNTER — OPTICAL OFFICE (OUTPATIENT)
Dept: URBAN - NONMETROPOLITAN AREA CLINIC 4 | Facility: CLINIC | Age: 30
Setting detail: OPHTHALMOLOGY
End: 2025-04-07

## 2025-04-07 DIAGNOSIS — H52.13: ICD-10-CM

## 2025-04-07 PROCEDURE — V2784 LENS POLYCARB OR EQUAL: HCPCS

## 2025-04-07 PROCEDURE — V2020 VISION SVCS FRAMES PURCHASES: HCPCS

## 2025-04-07 PROCEDURE — V2203 LENS SPHCYL BIFOCAL 4.00D/.1: HCPCS

## 2025-04-07 PROCEDURE — V2784 LENS POLYCARB OR EQUAL: HCPCS | Mod: LT

## 2025-04-07 PROCEDURE — V2203 LENS SPHCYL BIFOCAL 4.00D/.1: HCPCS | Mod: LT
